# Patient Record
Sex: MALE | Race: OTHER | URBAN - METROPOLITAN AREA
[De-identification: names, ages, dates, MRNs, and addresses within clinical notes are randomized per-mention and may not be internally consistent; named-entity substitution may affect disease eponyms.]

---

## 2022-04-06 NOTE — PROGRESS NOTES
Jean Pierre An   33 y.o. male   1995    HPI:    This is patient's first visit with me. Reason(s) for visit:   Chief Complaint   Patient presents with   Nithin Lang Doctor     Patient moved from [place], [place] (living there for 6 years) to for job reasons ([employer]) about 3-4 months ago. He works all the way in [place]. Health maintenance:    BP:  -Patient denied issues with frequent headache, chest pain, shortness of breath, palpitations, malaise, etc.    Weight/BMI:  Wt Readings from Last 3 Encounters:   04/08/22 149 lb 9.6 oz (67.9 kg)     -Physical activity: not active     Vision exam:  -Last exam: couple weeks ago  -No visual disturbances reported    Dental exam:  -Last exam: overdue  -No issues reported of dental pain, bleeding gums, temperature sensitivity, etc.    Colon CA screening:  -Last exam: none done  -No issues reported of hematochezia, melena, significant unintentional weight loss, chronic abdominal pain, nausea, vomiting, diarrhea, etc.  -No FMH of colon CA       There is no immunization history on file for this patient. No Known Allergies    No current outpatient medications on file prior to visit. No current facility-administered medications on file prior to visit.         Family History   Problem Relation Age of Onset    Hypertension Mother     Diabetes Mother     Breast Cancer Mother     Hypertension Father     Diabetes Father        Social History     Tobacco Use    Smoking status: Never Smoker    Smokeless tobacco: Never Used   Substance Use Topics    Alcohol use: Never        No results found for: WBC, HGB, HCT, MCV, PLT    Chemistry    No results found for: NA, K, CL, CO2, BUN, CREATININE, GLU No results found for: CALCIUM, ALKPHOS, AST, ALT, BILITOT       No results found for: ALT, AST, GGT, ALKPHOS, BILITOT  No results found for: LABA1C  No results found for: EAG    Review of Systems   Constitutional: Negative for activity change, appetite change, fatigue, fever and unexpected weight change. HENT: Negative for congestion, rhinorrhea, sinus pressure and trouble swallowing. Respiratory: Negative for cough, chest tightness, shortness of breath and wheezing. Cardiovascular: Negative for chest pain, palpitations and leg swelling. Gastrointestinal: Negative for abdominal distention, abdominal pain, blood in stool, constipation, diarrhea, nausea and vomiting. Genitourinary: Negative for dysuria, frequency and hematuria. Musculoskeletal: Negative for arthralgias and back pain. Skin: Negative for rash. Neurological: Negative for dizziness, weakness, light-headedness, numbness and headaches. Wt Readings from Last 3 Encounters:   04/08/22 149 lb 9.6 oz (67.9 kg)       BP Readings from Last 3 Encounters:   04/08/22 110/78       Pulse Readings from Last 3 Encounters:   04/08/22 90       /78   Pulse 90   Temp 97.6 °F (36.4 °C)   Ht 5' 8\" (1.727 m)   Wt 149 lb 9.6 oz (67.9 kg)   SpO2 95%   BMI 22.75 kg/m²      Physical Exam  Vitals reviewed. Constitutional:       General: He is awake. He is not in acute distress. Appearance: He is not ill-appearing or diaphoretic. HENT:      Head: Normocephalic and atraumatic. No abrasion or masses. Hair is normal.      Right Ear: External ear normal.      Left Ear: External ear normal.      Nose: Nose normal.   Eyes:      General: Lids are normal. Gaze aligned appropriately. No scleral icterus. Right eye: No discharge. Left eye: No discharge. Extraocular Movements: Extraocular movements intact. Conjunctiva/sclera: Conjunctivae normal.   Neck:      Trachea: Phonation normal.   Cardiovascular:      Rate and Rhythm: Normal rate and regular rhythm. Pulmonary:      Effort: Pulmonary effort is normal. No respiratory distress. Breath sounds: No wheezing, rhonchi or rales. Abdominal:      General: Abdomen is flat. There is no distension. Palpations: Abdomen is soft. Musculoskeletal:         General: No deformity. Normal range of motion. Cervical back: Normal range of motion. No erythema. Right lower leg: No edema. Left lower leg: No edema. Skin:     Coloration: Skin is not cyanotic, jaundiced or pale. Findings: No abrasion, abscess, bruising, ecchymosis, erythema, signs of injury, laceration, lesion, petechiae, rash or wound. Neurological:      General: No focal deficit present. Mental Status: He is alert. Mental status is at baseline. GCS: GCS eye subscore is 4. GCS verbal subscore is 5. GCS motor subscore is 6. Cranial Nerves: No cranial nerve deficit, dysarthria or facial asymmetry. Motor: No weakness, tremor, atrophy or seizure activity. Coordination: Coordination normal.      Gait: Gait is intact. Psychiatric:         Attention and Perception: Attention and perception normal.         Mood and Affect: Mood and affect normal.         Speech: Speech normal.         Behavior: Behavior normal. Behavior is cooperative. Thought Content: Thought content normal.       Assessment/Plan:   Hilda Burdick was seen today for established new doctor. Diagnoses and all orders for this visit:    Encounter for preventative adult health care examination  -     CBC with Auto Differential; Future  -     Hemoglobin A1C; Future  -     TSH; Future  -     T4, Free; Future  -     Renal Function Panel; Future  -     Hepatic Function Panel; Future  -     Lipid Panel; Future    Encounter to establish care with new doctor    Educated about 2019 novel coronavirus infection  Comments:  Advised to get COVID-19 booster vaccine. I reviewed the plan of care with the patient. Patient acknowledged understanding and agreed with plan of care overall. There are no discontinued medications.        Lifestyle measures discussed:  -Patient was advised to monitor caloric/sugar/carb intake, consume small portion sizes, well-balanced meals (nadir with fruits & vegetables), and exercise at least 150 min/week. We discussed also reviewing nutritional labels before purchasing a product and for simplicity sake, purchase the food product that has overall less of everything. It is also recommended to avoid frequent snacking and late night eating. Dental exam: recommended every 6 months as well as directed by dentist    Visual exam: recommended annually    General information on medications:  -When it comes to medications, whether with starting or adding a new medication or increasing the dose of a current medication, the benefits and risks have to always be considered and weighed over, especially if one is taking other medications as well. -There are no medications that have no side effects and that there is always a risk involved with taking a medication.    -If a side effect were to occur with starting a new medication or with increasing the dose of a current medication that either the medication can be totally discontinued altogether or simply decrease the dose of it and if this would be the case a follow-up appointment would be deemed necessary.    -The drug allergy list will then be updated with the corresponding side effect(s) if it's deemed to be a true 'drug allergy'. -The most common adverse effects of medication(s) were addressed at today's visit.    -Lastly, the coverage status of a medication may vary from insurance to insurance and the only way to verify if the medication is covered is to send an actual prescription in.    -The drug formulary of each insurance changes without any warning or notification to the healthcare provider let alone the pharmacy.  -The cost of medications vary from insurance to insurance and the cost is always subject to change just like the drug formulary. Follow-up: No follow-ups on file. .     Patient was informed that if his or her symptoms worsen to follow up with me sooner or go to the nearest ER if the symptoms are very significant and warrant higher level of care. Regarding my note:  -This note was composed (by me only and not with assistance via a scribe) to the best of my knowledge and recollection of the encounter with the patient using one of my own customized note templates utilizing a combination of typing and dictating with the [address] [place] [address] speech recognition software. As a result, the note may possibly contain various errors (e.g. spelling, grammar, and non-sensible words/phrases/statements) despite reviewing the note prior to signing it for completion. Time spent includes some or all of the following, both face-to-face time and non face-to-face time, but is not limited to:  [x] Preparing to see the patient by reviewing medical records available (notes, labs, imaging, etc.) prior to seeing the patient. [x] Obtaining and/or reviewing the history from the patient. [x] Performing a medically appropriate examination. [x] Ordering of relevant lab work, medications, referrals, or procedures. [x] Discussing patient's medical issues and formulating an assessment and plan. [x] Reviewing plan of care with patient. Answering any questions or concerns. [x] Documentation within the electronic health record (EHR)  [] Reviewing records of history relevant to patient's issues after seeing the patient. [] Discussion or coordination of care with other health care professionals  [] Other:     Abdi Bobby. Marylene Sons M.D.   [address] [place] [address]    Electronically signed by Rupert Fabian MD M.D. on 4/8/2022 at 9:11 AM. 1

## 2022-04-08 ENCOUNTER — OFFICE VISIT (OUTPATIENT)
Dept: FAMILY MEDICINE CLINIC | Age: 27
End: 2022-04-08
Payer: COMMERCIAL

## 2022-04-08 VITALS
OXYGEN SATURATION: 95 % | HEART RATE: 90 BPM | WEIGHT: 149.6 LBS | TEMPERATURE: 97.6 F | SYSTOLIC BLOOD PRESSURE: 110 MMHG | HEIGHT: 68 IN | BODY MASS INDEX: 22.67 KG/M2 | DIASTOLIC BLOOD PRESSURE: 78 MMHG

## 2022-04-08 DIAGNOSIS — Z00.00 ENCOUNTER FOR PREVENTATIVE ADULT HEALTH CARE EXAMINATION: Primary | ICD-10-CM

## 2022-04-08 DIAGNOSIS — Z71.89 EDUCATED ABOUT 2019 NOVEL CORONAVIRUS INFECTION: ICD-10-CM

## 2022-04-08 DIAGNOSIS — Z76.89 ENCOUNTER TO ESTABLISH CARE WITH NEW DOCTOR: ICD-10-CM

## 2022-04-08 PROCEDURE — 99385 PREV VISIT NEW AGE 18-39: CPT | Performed by: FAMILY MEDICINE

## 2022-04-08 ASSESSMENT — ENCOUNTER SYMPTOMS
NAUSEA: 0
ABDOMINAL DISTENTION: 0
BLOOD IN STOOL: 0
VOMITING: 0
SINUS PRESSURE: 0
SHORTNESS OF BREATH: 0
DIARRHEA: 0
BACK PAIN: 0
WHEEZING: 0
CHEST TIGHTNESS: 0
TROUBLE SWALLOWING: 0
RHINORRHEA: 0
COUGH: 0
ABDOMINAL PAIN: 0
CONSTIPATION: 0

## 2022-04-08 ASSESSMENT — PATIENT HEALTH QUESTIONNAIRE - PHQ9
SUM OF ALL RESPONSES TO PHQ9 QUESTIONS 1 & 2: 0
SUM OF ALL RESPONSES TO PHQ QUESTIONS 1-9: 0
SUM OF ALL RESPONSES TO PHQ QUESTIONS 1-9: 0
1. LITTLE INTEREST OR PLEASURE IN DOING THINGS: 0
SUM OF ALL RESPONSES TO PHQ QUESTIONS 1-9: 0
SUM OF ALL RESPONSES TO PHQ QUESTIONS 1-9: 0
2. FEELING DOWN, DEPRESSED OR HOPELESS: 0

## 2022-04-09 DIAGNOSIS — Z00.00 ENCOUNTER FOR PREVENTATIVE ADULT HEALTH CARE EXAMINATION: ICD-10-CM

## 2022-04-09 LAB
ALBUMIN SERPL-MCNC: 4.5 G/DL (ref 3.4–5)
ALP BLD-CCNC: 69 U/L (ref 40–129)
ALT SERPL-CCNC: 22 U/L (ref 10–40)
ANION GAP SERPL CALCULATED.3IONS-SCNC: 13 MMOL/L (ref 3–16)
AST SERPL-CCNC: 17 U/L (ref 15–37)
BASOPHILS ABSOLUTE: 0 K/UL (ref 0–0.2)
BASOPHILS RELATIVE PERCENT: 0.6 %
BILIRUB SERPL-MCNC: 0.5 MG/DL (ref 0–1)
BILIRUBIN DIRECT: <0.2 MG/DL (ref 0–0.3)
BILIRUBIN, INDIRECT: NORMAL MG/DL (ref 0–1)
BUN BLDV-MCNC: 15 MG/DL (ref 7–20)
CALCIUM SERPL-MCNC: 9.3 MG/DL (ref 8.3–10.6)
CHLORIDE BLD-SCNC: 103 MMOL/L (ref 99–110)
CHOLESTEROL, TOTAL: 180 MG/DL (ref 0–199)
CO2: 24 MMOL/L (ref 21–32)
CREAT SERPL-MCNC: 0.9 MG/DL (ref 0.9–1.3)
EOSINOPHILS ABSOLUTE: 0.1 K/UL (ref 0–0.6)
EOSINOPHILS RELATIVE PERCENT: 1.8 %
GFR AFRICAN AMERICAN: >60
GFR NON-AFRICAN AMERICAN: >60
GLUCOSE BLD-MCNC: 78 MG/DL (ref 70–99)
HCT VFR BLD CALC: 43 % (ref 40.5–52.5)
HDLC SERPL-MCNC: 46 MG/DL (ref 40–60)
HEMOGLOBIN: 14.2 G/DL (ref 13.5–17.5)
LDL CHOLESTEROL CALCULATED: 118 MG/DL
LYMPHOCYTES ABSOLUTE: 1.7 K/UL (ref 1–5.1)
LYMPHOCYTES RELATIVE PERCENT: 38.8 %
MCH RBC QN AUTO: 28.1 PG (ref 26–34)
MCHC RBC AUTO-ENTMCNC: 33.1 G/DL (ref 31–36)
MCV RBC AUTO: 84.8 FL (ref 80–100)
MONOCYTES ABSOLUTE: 0.4 K/UL (ref 0–1.3)
MONOCYTES RELATIVE PERCENT: 9 %
NEUTROPHILS ABSOLUTE: 2.1 K/UL (ref 1.7–7.7)
NEUTROPHILS RELATIVE PERCENT: 49.8 %
PDW BLD-RTO: 13 % (ref 12.4–15.4)
PHOSPHORUS: 4.1 MG/DL (ref 2.5–4.9)
PLATELET # BLD: 203 K/UL (ref 135–450)
PMV BLD AUTO: 10 FL (ref 5–10.5)
POTASSIUM SERPL-SCNC: 4.1 MMOL/L (ref 3.5–5.1)
RBC # BLD: 5.07 M/UL (ref 4.2–5.9)
SODIUM BLD-SCNC: 140 MMOL/L (ref 136–145)
T4 FREE: 1.4 NG/DL (ref 0.9–1.8)
TOTAL PROTEIN: 6.9 G/DL (ref 6.4–8.2)
TRIGL SERPL-MCNC: 78 MG/DL (ref 0–150)
TSH SERPL DL<=0.05 MIU/L-ACNC: 2.11 UIU/ML (ref 0.27–4.2)
VLDLC SERPL CALC-MCNC: 16 MG/DL
WBC # BLD: 4.3 K/UL (ref 4–11)

## 2022-04-10 PROBLEM — Z78.9 LDL-C GREATER THAN OR EQUAL TO 100 MG/DL: Status: ACTIVE | Noted: 2022-04-10

## 2022-04-10 LAB
ESTIMATED AVERAGE GLUCOSE: 96.8 MG/DL
HBA1C MFR BLD: 5 %

## 2024-04-05 ENCOUNTER — OFFICE VISIT (OUTPATIENT)
Dept: SURGERY | Age: 29
End: 2024-04-05
Payer: COMMERCIAL

## 2024-04-05 VITALS
BODY MASS INDEX: 22.66 KG/M2 | DIASTOLIC BLOOD PRESSURE: 78 MMHG | SYSTOLIC BLOOD PRESSURE: 123 MMHG | WEIGHT: 149 LBS | HEART RATE: 74 BPM

## 2024-04-05 DIAGNOSIS — L05.91 CHRONIC RECURRENT PILONIDAL CYST WITHOUT ABSCESS: Primary | ICD-10-CM

## 2024-04-05 PROCEDURE — 99203 OFFICE O/P NEW LOW 30 MIN: CPT | Performed by: SURGERY

## 2024-04-05 RX ORDER — AMOXICILLIN AND CLAVULANATE POTASSIUM 875; 125 MG/1; MG/1
1 TABLET, FILM COATED ORAL 2 TIMES DAILY
Qty: 20 TABLET | Refills: 2 | Status: SHIPPED | OUTPATIENT
Start: 2024-04-05 | End: 2024-04-15

## 2024-04-05 NOTE — PROGRESS NOTES
PATIENT NAME: Hernan Conroy     YOB: 1995     TODAY'S DATE: 4/30/2024    Reason for Visit:  Recurrent pilonidal cyst    Requesting Physician:  Dr. Barber    HISTORY OF PRESENT ILLNESS:              The patient is a 29 y.o. male with a PMHx as delineated below who presents with with mild sacrococcygeal pain and swelling that started about a week ago and has improved since. The patient has a history of a pilonidal cystectomy in the past. This is his first recurrence since this surgery.     Chief Complaint   Patient presents with    New Patient     pilonidal cyst         REVIEW OF SYSTEMS:  CONSTITUTIONAL:  negative  HEENT:  negative  RESPIRATORY:  negative  CARDIOVASCULAR:  negative  GASTROINTESTINAL:  negative   GENITOURINARY:  negative  HEMATOLOGIC/LYMPHATIC:  negative  MUSCULOSKELETAL: negative  NEUROLOGICAL:  negative    PMH  No past medical history on file.    PSH  Past Surgical History:   Procedure Laterality Date    CYST REMOVAL      Tailbone       Social History  Social History     Socioeconomic History    Marital status: Single     Spouse name: Not on file    Number of children: Not on file    Years of education: Not on file    Highest education level: Not on file   Occupational History    Not on file   Tobacco Use    Smoking status: Never    Smokeless tobacco: Never   Vaping Use    Vaping Use: Never used   Substance and Sexual Activity    Alcohol use: Never    Drug use: Never    Sexual activity: Not on file   Other Topics Concern    Not on file   Social History Narrative    Not on file     Social Determinants of Health     Financial Resource Strain: Not on file   Food Insecurity: Not on file   Transportation Needs: Not on file   Physical Activity: Not on file   Stress: Not on file   Social Connections: Not on file   Intimate Partner Violence: Not on file   Housing Stability: Not on file       Family History:       Problem Relation Age of Onset    Hypertension Mother     Diabetes Mother     Breast